# Patient Record
Sex: FEMALE | Race: WHITE | NOT HISPANIC OR LATINO | Employment: OTHER | URBAN - NONMETROPOLITAN AREA
[De-identification: names, ages, dates, MRNs, and addresses within clinical notes are randomized per-mention and may not be internally consistent; named-entity substitution may affect disease eponyms.]

---

## 2021-08-11 ENCOUNTER — OFFICE VISIT (OUTPATIENT)
Dept: SLEEP MEDICINE | Facility: CLINIC | Age: 61
End: 2021-08-11
Payer: OTHER GOVERNMENT

## 2021-08-11 VITALS
SYSTOLIC BLOOD PRESSURE: 138 MMHG | HEART RATE: 69 BPM | OXYGEN SATURATION: 97 % | DIASTOLIC BLOOD PRESSURE: 79 MMHG | WEIGHT: 224.63 LBS | BODY MASS INDEX: 39.8 KG/M2 | HEIGHT: 63 IN

## 2021-08-11 DIAGNOSIS — G47.33 OSA ON CPAP: Primary | ICD-10-CM

## 2021-08-11 DIAGNOSIS — G47.11 IDIOPATHIC HYPERSOMNIA: ICD-10-CM

## 2021-08-11 PROBLEM — E11.9 TYPE 2 DIABETES MELLITUS, WITHOUT LONG-TERM CURRENT USE OF INSULIN: Status: ACTIVE | Noted: 2021-08-11

## 2021-08-11 PROCEDURE — 99213 OFFICE O/P EST LOW 20 MIN: CPT | Mod: S$PBB,,, | Performed by: FAMILY MEDICINE

## 2021-08-11 PROCEDURE — 99213 PR OFFICE/OUTPT VISIT, EST, LEVL III, 20-29 MIN: ICD-10-PCS | Mod: S$PBB,,, | Performed by: FAMILY MEDICINE

## 2021-08-11 PROCEDURE — 99204 OFFICE O/P NEW MOD 45 MIN: CPT | Mod: PBBFAC,PN | Performed by: FAMILY MEDICINE

## 2021-08-11 RX ORDER — ESCITALOPRAM OXALATE 20 MG/1
20 TABLET ORAL DAILY
COMMUNITY

## 2021-08-11 RX ORDER — OMEPRAZOLE 20 MG/1
20 CAPSULE, DELAYED RELEASE ORAL DAILY
COMMUNITY

## 2021-08-11 RX ORDER — MELOXICAM 15 MG/1
15 TABLET ORAL DAILY
COMMUNITY

## 2021-08-11 RX ORDER — TELMISARTAN AND HYDROCHLORTHIAZIDE 40; 12.5 MG/1; MG/1
0.5 TABLET ORAL DAILY
COMMUNITY

## 2021-08-11 RX ORDER — MULTIVITAMIN
1 TABLET ORAL DAILY
COMMUNITY

## 2021-08-11 RX ORDER — POTASSIUM CHLORIDE 750 MG/1
10 CAPSULE, EXTENDED RELEASE ORAL ONCE
COMMUNITY

## 2021-08-11 RX ORDER — ATORVASTATIN CALCIUM 10 MG/1
10 TABLET, FILM COATED ORAL DAILY
COMMUNITY

## 2021-08-11 RX ORDER — AMOXICILLIN 500 MG
CAPSULE ORAL DAILY
COMMUNITY

## 2021-08-11 RX ORDER — CETIRIZINE HYDROCHLORIDE 10 MG/1
10 TABLET ORAL DAILY
COMMUNITY

## 2021-08-11 RX ORDER — AMLODIPINE BESYLATE 5 MG/1
5 TABLET ORAL DAILY
COMMUNITY

## 2023-03-17 ENCOUNTER — TELEPHONE (OUTPATIENT)
Dept: SLEEP MEDICINE | Facility: CLINIC | Age: 63
End: 2023-03-17
Payer: OTHER GOVERNMENT

## 2023-03-20 ENCOUNTER — OFFICE VISIT (OUTPATIENT)
Dept: SLEEP MEDICINE | Facility: CLINIC | Age: 63
End: 2023-03-20
Payer: OTHER GOVERNMENT

## 2023-03-20 VITALS
OXYGEN SATURATION: 99 % | BODY MASS INDEX: 36.75 KG/M2 | HEART RATE: 68 BPM | SYSTOLIC BLOOD PRESSURE: 142 MMHG | WEIGHT: 207.38 LBS | DIASTOLIC BLOOD PRESSURE: 86 MMHG | HEIGHT: 63 IN

## 2023-03-20 DIAGNOSIS — G47.33 OSA ON CPAP: Primary | ICD-10-CM

## 2023-03-20 DIAGNOSIS — I10 ESSENTIAL HYPERTENSION: ICD-10-CM

## 2023-03-20 PROCEDURE — 99213 PR OFFICE/OUTPT VISIT, EST, LEVL III, 20-29 MIN: ICD-10-PCS | Mod: S$PBB,,, | Performed by: FAMILY MEDICINE

## 2023-03-20 PROCEDURE — 99213 OFFICE O/P EST LOW 20 MIN: CPT | Mod: S$PBB,,, | Performed by: FAMILY MEDICINE

## 2023-03-20 PROCEDURE — 99214 OFFICE O/P EST MOD 30 MIN: CPT | Mod: PBBFAC | Performed by: FAMILY MEDICINE

## 2023-03-20 RX ORDER — FLUOXETINE HYDROCHLORIDE 20 MG/1
20 CAPSULE ORAL DAILY
COMMUNITY

## 2023-03-20 NOTE — PROGRESS NOTES
Patient presents to clinic for CPAP F/U. ESS is 15. Patient gets supplies from [facility] in [place]. Patient wears a nasal mask. She is complaining of the air being too cold and needs a heated tubing. She has not worn machine a lot due to the air being cold causing her to wake up coughing. She has received new machine.

## 2023-03-20 NOTE — PROGRESS NOTES
Subjective:       Patient ID: Crystal Otero is a 63 y.o. female.    Chief Complaint: Sleep Apnea (CPAP management)    Patient is seen in sleep clinic for CPAP management and has received a new CPAP machine but is not using it much due to the fact that air is very cold and she is having difficulty tolerating it.  Patient's 90 day compliance is 26.7% with an average AHI of 3.2 at a pressure of 15.5 cm H2O.  [place] score is 15 and patient uses a nasal mask.  Patient's initial AHI was 21.  Patient's vivid dreams and possible nightmares has greatly improved with changes in her medication.  I think the patient will do much better with a heated hose and I also discussed risk of not treating her KADIE.  I have asked the patient to follow-up in 1 month.    Review of Systems   Constitutional:  Positive for fatigue.        Negative EDS   Respiratory:  Positive for apnea.    Psychiatric/Behavioral:  Negative for sleep disturbance.        Objective:      Physical Exam  Constitutional:       Appearance: She is obese.   Cardiovascular:      Rate and Rhythm: Normal rate and regular rhythm.      Heart sounds: No murmur heard.  Pulmonary:      Breath sounds: Normal breath sounds.   Skin:     General: Skin is warm and dry.   Neurological:      Mental Status: She is alert and oriented to person, place, and time.       Assessment:       Problem List Items Addressed This Visit          Cardiac/Vascular    Essential hypertension       Other    KADIE on CPAP - Primary         Plan:       Continue CPAP @ 15.5 cm H20  Caution while operating a motor vehicle  Prescription for new supplies  Follow up sleep clinic in 1 month.

## 2023-05-04 ENCOUNTER — TELEPHONE (OUTPATIENT)
Dept: SLEEP MEDICINE | Facility: CLINIC | Age: 63
End: 2023-05-04
Payer: OTHER GOVERNMENT

## 2023-07-14 ENCOUNTER — TELEPHONE (OUTPATIENT)
Dept: SLEEP MEDICINE | Facility: CLINIC | Age: 63
End: 2023-07-14
Payer: OTHER GOVERNMENT

## 2023-07-17 ENCOUNTER — OFFICE VISIT (OUTPATIENT)
Dept: SLEEP MEDICINE | Facility: CLINIC | Age: 63
End: 2023-07-17
Attending: FAMILY MEDICINE
Payer: OTHER GOVERNMENT

## 2023-07-17 VITALS
SYSTOLIC BLOOD PRESSURE: 128 MMHG | HEART RATE: 71 BPM | DIASTOLIC BLOOD PRESSURE: 71 MMHG | HEIGHT: 63 IN | OXYGEN SATURATION: 98 % | BODY MASS INDEX: 37.6 KG/M2 | WEIGHT: 212.19 LBS

## 2023-07-17 DIAGNOSIS — I10 ESSENTIAL HYPERTENSION: ICD-10-CM

## 2023-07-17 DIAGNOSIS — G47.33 OSA ON CPAP: Primary | ICD-10-CM

## 2023-07-17 DIAGNOSIS — F51.12 BEHAVIORALLY INDUCED INSUFFICIENT SLEEP SYNDROME: ICD-10-CM

## 2023-07-17 DIAGNOSIS — G47.11 IDIOPATHIC HYPERSOMNIA: ICD-10-CM

## 2023-07-17 PROCEDURE — 99212 OFFICE O/P EST SF 10 MIN: CPT | Mod: S$PBB,,, | Performed by: FAMILY MEDICINE

## 2023-07-17 PROCEDURE — 99214 OFFICE O/P EST MOD 30 MIN: CPT | Mod: PBBFAC | Performed by: FAMILY MEDICINE

## 2023-07-17 PROCEDURE — 99212 PR OFFICE/OUTPT VISIT, EST, LEVL II, 10-19 MIN: ICD-10-PCS | Mod: S$PBB,,, | Performed by: FAMILY MEDICINE

## 2023-07-17 NOTE — PROGRESS NOTES
Subjective     Patient ID: Crystal Otero is a 63 y.o. female.    Chief Complaint: Sleep Apnea (CPAP management)    Patient follows up in sleep clinic for CPAP management and has been using her CPAP for the last 30 days 86.7% of the time with an AHI of 3.3 at a pressure of 15.5 cm H2O.  The patient is using and benefitting from CPAP.  Patient's initial AHI was 21 and it is noted the patient is averaging 6 hours of sleep per night.  I have recommended to the patient to increase her hours of sleep.  Patient uses a nasal mask.    Review of Systems   Constitutional:  Positive for fatigue.        Negative EDS   Respiratory:  Negative for apnea.    Psychiatric/Behavioral:  Negative for sleep disturbance.         Objective     Physical Exam  Constitutional:       Appearance: She is obese.   Cardiovascular:      Rate and Rhythm: Normal rate and regular rhythm.      Heart sounds: No murmur heard.  Pulmonary:      Breath sounds: Normal breath sounds.   Skin:     General: Skin is warm and dry.   Neurological:      Mental Status: She is alert and oriented to person, place, and time.          Assessment and Plan     1. KADIE on CPAP    2. Essential hypertension    3. Idiopathic hypersomnia  Comments:  Diagnosis from medical history  Overview:  Diagnosed by medical history.      4. Behaviorally induced insufficient sleep syndrome        Continue CPAP @ 15.5 cm H20  Caution while operating a motor vehicle  Increase hours of sleep  Weight loss management  Prescription for new supplies  Follow up sleep clinic in 1 year.           Follow up in about 1 year (around 7/17/2024).

## 2023-07-17 NOTE — PROGRESS NOTES
Patient presents to clinic for CPAP F/U. ESS is 13. Patient gets supplies from [facility] in [place]. Patient wears nasal pillows with non heated tubing.

## 2024-01-10 ENCOUNTER — TELEPHONE (OUTPATIENT)
Dept: SLEEP MEDICINE | Facility: CLINIC | Age: 64
End: 2024-01-10
Payer: OTHER GOVERNMENT